# Patient Record
(demographics unavailable — no encounter records)

---

## 2024-11-11 NOTE — HISTORY OF PRESENT ILLNESS
[de-identified] : 11/11/2024:  fall 2.5 wks ago w/ ankle pain. no tx to date. using boot from prior injury. no prior sig ankle probs. 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 denies dm/tob.  [] : no [FreeTextEntry1] : right ankle

## 2024-11-11 NOTE — ASSESSMENT
[FreeTextEntry1] : protected wb in cam boot ice/elevate nsaids prn limit actiivty f/up 2 wks w/ ankle xray

## 2024-11-11 NOTE — PHYSICAL EXAM
[Right] : right foot and ankle [2+] : dorsalis pedis pulse: 2+ [] : mildly antalgic [FreeTextEntry9] : limited at baseline [de-identified] : weakness of DF at baseline